# Patient Record
Sex: MALE | Race: WHITE | HISPANIC OR LATINO | Employment: FULL TIME | URBAN - METROPOLITAN AREA
[De-identification: names, ages, dates, MRNs, and addresses within clinical notes are randomized per-mention and may not be internally consistent; named-entity substitution may affect disease eponyms.]

---

## 2022-12-22 ENCOUNTER — OFFICE VISIT (OUTPATIENT)
Dept: URGENT CARE | Facility: MEDICAL CENTER | Age: 22
End: 2022-12-22

## 2022-12-22 VITALS
TEMPERATURE: 98 F | OXYGEN SATURATION: 98 % | SYSTOLIC BLOOD PRESSURE: 120 MMHG | RESPIRATION RATE: 18 BRPM | HEIGHT: 71 IN | WEIGHT: 190 LBS | DIASTOLIC BLOOD PRESSURE: 73 MMHG | BODY MASS INDEX: 26.6 KG/M2 | HEART RATE: 90 BPM

## 2022-12-22 DIAGNOSIS — J11.1 INFLUENZA-LIKE ILLNESS: Primary | ICD-10-CM

## 2022-12-22 NOTE — LETTER
December 22, 2022     Patient: Aubrie Toscano   YOB: 2000   Date of Visit: 12/22/2022       To Whom It May Concern: It is my medical opinion that Aubrie Toscano may return to work on 12/26/22  If you have any questions or concerns, please don't hesitate to call           Sincerely,        Amaya Gonzalez PA-C    CC: No Recipients

## 2022-12-22 NOTE — PATIENT INSTRUCTIONS
1  Increase fluids  2  Motrin as needed for fever/body aches  3  Isolation until test results available  4   Follow up with PCP in 3-5 days if symptoms persist

## 2022-12-22 NOTE — PROGRESS NOTES
[id number] [facility] [facility]        NAME: Arvin Saab is a 25 y o  male  : 2000    MRN: [id number]  DATE: 2022  TIME: 10:41 AM    Assessment and Plan   Influenza-like illness [J11 1]  1  Influenza-like illness  Covid19 and INFLUENZA A/B PCR            Patient Instructions     1  Increase fluids  2  Motrin as needed for fever/body aches  3  Isolation until test results available  4  Follow up with PCP in 3-5 days if symptoms persist       Chief Complaint     Chief Complaint   Patient presents with   • Fever     Patient states since Tuesday he has fever, body aches, fatigue, and cough since Tuesday; patient states he has been out of work and needs note          History of Present Illness       Dorma Reveal a 26-year-old male who presents with a 2-day history of acute onset fever, chills, body aches nasal discharge and cough  Patient reports no vomiting or diarrhea since the onset of his illness  Fever  Associated symptoms include chills, congestion, fatigue and a fever  Review of Systems   Review of Systems   Constitutional: Positive for chills, fatigue and fever  HENT: Positive for congestion and postnasal drip  Respiratory: Negative  Gastrointestinal: Negative  Current Medications     No current outpatient medications on file  Current Allergies     Allergies as of 2022 - Reviewed 2022   Allergen Reaction Noted   • Penicillins Other (See Comments) 2022            The following portions of the patient's history were reviewed and updated as appropriate: allergies, current medications, past family history, past medical history, past social history, past surgical history and problem list      Past Medical History:   Diagnosis Date   • ADHD (attention deficit hyperactivity disorder)        History reviewed  No pertinent surgical history  No family history on file  Medications have been verified          Objective   /73   Pulse 90   Temp 98 °F (36 7 °C) (Temporal)   Resp 18   Ht 5' 11" (1 803 m)   Wt 86 2 kg (190 lb)   SpO2 98%   BMI 26 50 kg/m²   No LMP for male patient  Physical Exam     Physical Exam  Constitutional:       General: He is not in acute distress  Appearance: Normal appearance  He is not ill-appearing  HENT:      Head: Normocephalic and atraumatic  Right Ear: Tympanic membrane and ear canal normal       Left Ear: Tympanic membrane and ear canal normal       Nose: Congestion and rhinorrhea present  Rhinorrhea is clear  Mouth/Throat:      Lips: Pink  Pharynx: Oropharynx is clear  Cardiovascular:      Rate and Rhythm: Normal rate and regular rhythm  Heart sounds: Normal heart sounds, S1 normal and S2 normal  No murmur heard  Pulmonary:      Effort: Pulmonary effort is normal       Breath sounds: Normal breath sounds and air entry  Neurological:      Mental Status: He is alert  Implemented All Universal Safety Interventions:  [place] to call system. Call bell, personal items and telephone within reach. Instruct patient to call for assistance. Room bathroom lighting operational. Non-slip footwear when patient is off stretcher. Physically safe environment: no spills, clutter or unnecessary equipment. Stretcher in lowest position, wheels locked, appropriate side rails in place.

## 2022-12-23 LAB
FLUAV RNA RESP QL NAA+PROBE: POSITIVE
FLUBV RNA RESP QL NAA+PROBE: NEGATIVE
SARS-COV-2 RNA RESP QL NAA+PROBE: NEGATIVE